# Patient Record
Sex: FEMALE | Race: WHITE | NOT HISPANIC OR LATINO | Employment: FULL TIME | URBAN - METROPOLITAN AREA
[De-identification: names, ages, dates, MRNs, and addresses within clinical notes are randomized per-mention and may not be internally consistent; named-entity substitution may affect disease eponyms.]

---

## 2023-06-23 ENCOUNTER — HOSPITAL ENCOUNTER (EMERGENCY)
Facility: CLINIC | Age: 23
Discharge: HOME | End: 2023-06-23
Attending: EMERGENCY MEDICINE | Admitting: EMERGENCY MEDICINE
Payer: MEDICAID

## 2023-06-23 VITALS
DIASTOLIC BLOOD PRESSURE: 85 MMHG | HEART RATE: 81 BPM | RESPIRATION RATE: 15 BRPM | BODY MASS INDEX: 24.25 KG/M2 | SYSTOLIC BLOOD PRESSURE: 120 MMHG | OXYGEN SATURATION: 99 % | HEIGHT: 68 IN | WEIGHT: 160 LBS | TEMPERATURE: 97.7 F

## 2023-06-23 DIAGNOSIS — S01.312A LACERATION OF LEFT EARLOBE, INITIAL ENCOUNTER: ICD-10-CM

## 2023-06-23 PROCEDURE — 12011 RPR F/E/E/N/L/M 2.5 CM/<: CPT

## 2023-06-23 PROCEDURE — 250N000011 HC RX IP 250 OP 636: Performed by: EMERGENCY MEDICINE

## 2023-06-23 PROCEDURE — 90471 IMMUNIZATION ADMIN: CPT | Performed by: EMERGENCY MEDICINE

## 2023-06-23 PROCEDURE — 99283 EMERGENCY DEPT VISIT LOW MDM: CPT | Mod: 25

## 2023-06-23 PROCEDURE — 90715 TDAP VACCINE 7 YRS/> IM: CPT | Performed by: EMERGENCY MEDICINE

## 2023-06-23 RX ADMIN — CLOSTRIDIUM TETANI TOXOID ANTIGEN (FORMALDEHYDE INACTIVATED), CORYNEBACTERIUM DIPHTHERIAE TOXOID ANTIGEN (FORMALDEHYDE INACTIVATED), BORDETELLA PERTUSSIS TOXOID ANTIGEN (GLUTARALDEHYDE INACTIVATED), BORDETELLA PERTUSSIS FILAMENTOUS HEMAGGLUTININ ANTIGEN (FORMALDEHYDE INACTIVATED), BORDETELLA PERTUSSIS PERTACTIN ANTIGEN, AND BORDETELLA PERTUSSIS FIMBRIAE 2/3 ANTIGEN 0.5 ML: 5; 2; 2.5; 5; 3; 5 INJECTION, SUSPENSION INTRAMUSCULAR at 04:43

## 2023-06-23 ASSESSMENT — ACTIVITIES OF DAILY LIVING (ADL): ADLS_ACUITY_SCORE: 35

## 2023-06-23 NOTE — ED TRIAGE NOTES
Pt presents to ED with reports of an ear injury when her cat jumped off of her head this morning, causing a tear to her earlobe.  Pt had an earring in place that was pulled down during event.  Unsure of last tetanus shot.  Injury occurred about 15 minutes PTA.  Pt had bandaid in place.     Triage Assessment     Row Name 06/23/23 0416       Triage Assessment (Adult)    Airway WDL WDL       Respiratory WDL    Respiratory WDL WDL       Skin Circulation/Temperature WDL    Skin Circulation/Temperature WDL WDL       Cardiac WDL    Cardiac WDL WDL       Peripheral/Neurovascular WDL    Peripheral Neurovascular WDL WDL       Cognitive/Neuro/Behavioral WDL    Cognitive/Neuro/Behavioral WDL WDL

## 2023-06-23 NOTE — ED PROVIDER NOTES
EMERGENCY DEPARTMENT ENCOUNTER      NAME: Rosa Simms  AGE: 23 year old female  YOB: 2000  MRN: [id number]  EVALUATION DATE & TIME: 6/23/2023  4:15 AM    PCP: System, Provider Not In    ED PROVIDER: Damion Bejarano M.D.      Chief Complaint   Patient presents with     Ear Injury         FINAL IMPRESSION:  1. Laceration of left earlobe, initial encounter          ED COURSE & MEDICAL DECISION MAKING:    Pertinent Labs & Imaging studies reviewed. (See chart for details)  23 year old female presents to the Emergency Department for evaluation of earlobe laceration.  Repaired as below.  Tetanus updated.  No signs of further injury.  Will discharge home.  Discussed wound care.    4:30 AM I met with the patient to gather history and to perform my initial exam. I discussed the plan for care while in the Emergency Department. PPE: gloves.  4:46 AM I repaired laceration. We discussed the plan for discharge and the patient is agreeable. Reviewed supportive cares, symptomatic treatment, outpatient follow up, and reasons to return to the Emergency Department. Patient to be discharged by ED RN.     At the conclusion of the encounter I discussed the results of all of the tests and the disposition. The questions were answered. The patient or family acknowledged understanding and was agreeable with the care plan.     Medical Decision Making    History:    Supplemental history from: Documented in chart, if applicable    External Record(s) reviewed: Documented in chart, if applicable. and Other: Immunization Records    Work Up:    Chart documentation includes differential considered and any EKGs or imaging independently interpreted by provider, where specified.    In additional to work up documented, I considered the following work up: Documented in chart, if applicable.    External consultation:    Discussion of management with another provider: Documented in chart, if applicable    Complicating factors:    Care  impacted by chronic illness: N/A    Care affected by social determinants of health: Access to Medical Care    Disposition considerations: Discharge. No recommendations on prescription strength medication(s). See documentation for any additional details.        0 minutes of critical care time     MEDICATIONS GIVEN IN THE EMERGENCY:  Medications   Tdap (tetanus-diphtheria-acell pertussis) (ADACEL) injection 0.5 mL (0.5 mLs Intramuscular $Given 6/23/23 9235)       NEW PRESCRIPTIONS STARTED AT TODAY'S ER VISIT  There are no discharge medications for this patient.         =================================================================    HPI    Patient information was obtained from: Patient    Use of : N/A        Rosa Simms is a 23 year old female with no pertinent history who presents to this ED via walk-in for evaluation of ear injury.    Patient reports that she was sleeping on the couch tonight with her cat. She states that her cat decided to jump off of the couch and caught her left earring in the process and tore her left earlobe about 45 minutes ago, prompting her to be seen in the ED. Otherwise, patient denies taking any medications regularly. No allergies to medications. She is unsure when her last tetanus was.     Per chart review, patient's tetanus was in 2011.     REVIEW OF SYSTEMS   Review of Systems   HENT:        Positive for tear to left earlobe      PAST MEDICAL HISTORY:  History reviewed. No pertinent past medical history.    PAST SURGICAL HISTORY:  History reviewed. No pertinent surgical history.        CURRENT MEDICATIONS:    No current facility-administered medications for this encounter.     No current outpatient medications on file.         ALLERGIES:  No Known Allergies    FAMILY HISTORY:  History reviewed. No pertinent family history.    SOCIAL HISTORY:   Social History     Socioeconomic History     Marital status: Single       VITALS:  /85   Pulse 81   Temp 97.7  F  "(36.5  C) (Oral)   Resp 15   Ht 1.727 m (5' 8\")   Wt 72.6 kg (160 lb)   SpO2 99%   BMI 24.33 kg/m      PHYSICAL EXAM    Physical Exam  Constitutional:       General: She is not in acute distress.     Appearance: Normal appearance. She is well-developed.   HENT:      Head: Normocephalic and atraumatic.      Ears:      Comments: Left earlobe has a 1 cm laceration that is not through and through but only over the anterior aspect.  Eyes:      General: No scleral icterus.     Conjunctiva/sclera: Conjunctivae normal.   Cardiovascular:      Rate and Rhythm: Normal rate.   Pulmonary:      Effort: Pulmonary effort is normal. No respiratory distress.   Abdominal:      General: Abdomen is flat.   Musculoskeletal:      Cervical back: Normal range of motion and neck supple.   Skin:     General: Skin is warm and dry.      Findings: No rash.   Neurological:      Mental Status: She is alert and oriented to person, place, and time.           LAB:  All pertinent labs reviewed and interpreted.  Labs Ordered and Resulted from Time of ED Arrival to Time of ED Departure - No data to display    RADIOLOGY:  Reviewed all pertinent imaging. Please see official radiology report.  No orders to display         PROCEDURES:   PROCEDURE: Laceration Repair   INDICATIONS: Laceration   PROCEDURE PROVIDER: Dr Damion Bejarano   SITE: Left Ear   TYPE/SIZE: simple, clean and no foreign body visualized  1 cm (total length)   FUNCTIONAL ASSESSMENT: Distal sensation, circulation and motor intact   MEDICATION: 2 mLs of 1% Lidocaine with epinephrine   PREPARATION: irrigation with Normal saline   DEBRIDEMENT: no debridement   CLOSURE:  Superficial layer closed with 3 stitches of 4-0 Vycril simple interrupted    Total number of sutures/staples placed: 3             I, Elaina Benitez, am serving as a scribe to document services personally performed by Dr. Damion Bejarano, based on my observation and the provider's statements to me. IDamion MD attest " that Elaina Benitez is acting in a scribe capacity, has observed my performance of the services and has documented them in accordance with my direction.    Damion Bejarano M.D.  Emergency Medicine  [facility] EMERGENCY ROOM  [address] [address]  [phone]  Dept: [phone]     Damion Bejarano MD  06/23/23 0595

## 2023-11-27 ENCOUNTER — NURSE TRIAGE (OUTPATIENT)
Dept: NURSING | Facility: CLINIC | Age: 23
End: 2023-11-27
Payer: MEDICAID

## 2023-11-27 ENCOUNTER — HOSPITAL ENCOUNTER (EMERGENCY)
Facility: CLINIC | Age: 23
Discharge: HOME | End: 2023-11-27
Attending: EMERGENCY MEDICINE | Admitting: EMERGENCY MEDICINE
Payer: MEDICAID

## 2023-11-27 VITALS
TEMPERATURE: 98.8 F | HEIGHT: 68 IN | RESPIRATION RATE: 18 BRPM | BODY MASS INDEX: 25.39 KG/M2 | OXYGEN SATURATION: 100 % | DIASTOLIC BLOOD PRESSURE: 80 MMHG | HEART RATE: 68 BPM | SYSTOLIC BLOOD PRESSURE: 125 MMHG | WEIGHT: 167.55 LBS

## 2023-11-27 DIAGNOSIS — G51.0 RIGHT-SIDED BELL'S PALSY: ICD-10-CM

## 2023-11-27 PROCEDURE — 250N000012 HC RX MED GY IP 250 OP 636 PS 637: Performed by: EMERGENCY MEDICINE

## 2023-11-27 PROCEDURE — 99284 EMERGENCY DEPT VISIT MOD MDM: CPT

## 2023-11-27 PROCEDURE — 250N000013 HC RX MED GY IP 250 OP 250 PS 637: Performed by: EMERGENCY MEDICINE

## 2023-11-27 RX ORDER — NITROFURANTOIN 25; 75 MG/1; MG/1
CAPSULE ORAL
COMMUNITY
Start: 2023-08-10

## 2023-11-27 RX ORDER — VALACYCLOVIR HYDROCHLORIDE 1 G/1
1000 TABLET, FILM COATED ORAL ONCE
Status: COMPLETED | OUTPATIENT
Start: 2023-11-27 | End: 2023-11-27

## 2023-11-27 RX ORDER — VALACYCLOVIR HYDROCHLORIDE 1 G/1
1000 TABLET, FILM COATED ORAL 3 TIMES DAILY
Qty: 21 TABLET | Refills: 0 | Status: SHIPPED | OUTPATIENT
Start: 2023-11-27 | End: 2023-12-04

## 2023-11-27 RX ORDER — PREDNISONE 20 MG/1
60 TABLET ORAL ONCE
Status: COMPLETED | OUTPATIENT
Start: 2023-11-27 | End: 2023-11-27

## 2023-11-27 RX ORDER — PREDNISONE 20 MG/1
60 TABLET ORAL DAILY
Qty: 21 TABLET | Refills: 0 | Status: SHIPPED | OUTPATIENT
Start: 2023-11-27 | End: 2023-12-04

## 2023-11-27 RX ADMIN — VALACYCLOVIR 1000 MG: 1 TABLET, FILM COATED ORAL at 16:36

## 2023-11-27 RX ADMIN — PREDNISONE 60 MG: 20 TABLET ORAL at 16:36

## 2023-11-27 NOTE — ED PROVIDER NOTES
"EMERGENCY DEPARTMENT ENCOUNTER      NAME: Rosa Simms  AGE: 23 year old female  YOB: 2000  MRN: [id number]  EVALUATION DATE & TIME: No admission date for patient encounter.    PCP: No Ref-Primary, Physician    ED PROVIDER: Mo Mcgowan M.D.      Chief Complaint   Patient presents with    Numbness         IMPRESSION  1. Right-sided Bell's palsy        PLAN  - prednisone 60mg q24h x7 days  - valacyclovir 1g q8h x7 days  - OTC eye drops q1h while awake  - OTC eye ointment w/ eye taped shut at night  - close eye clinic follow up  - discharge to home    ED COURSE & MEDICAL DECISION MAKING    ED Course as of 11/27/23 1638   Mon Nov 27, 2023   1621 23yoF with no significant past medical history presenting from home with her significant other for evaluation of right facial weakness; first noticed yesterday with she woke up at 11am (>24 hours ago). Denies any tingling, vision changes, speech changes, confusion, headache. States she just got over a \"cold\" a couple days ago. Denies any rash, fevers, chills, any other problems or complaints at this time. Came because she felt like her ability to close her right eye has been worsening since yesterday; denies any eye pain or redness.    Normal vitals on presentation. Calm on exam with mild right peripheral facial weakness (still able to close right eyelid completely) with CN 2-12 otherwise completely intact, no skin erythema or tenderness, no ear tenderness/redness with canal unremarkable, no meningismus, no scalp or temporal tenderness, clear lungs, normal work of breathing, otherwise completely normal neuro exam.    Has right Bell's palsy on exam; not severe as still able to close eyelid. No concern for central neuro process such as stroke. Doubt cellulitis, necrotizing fasciitis, sepsis, brain mass either. Patient comfortable not obtaining blood tests, CT, or MRI here now which I agree with. Given timing, given 1st doses of prednisone & acyclovir here " now and prescriptions for home. Advised OTC eye ointment at night & lubricating eye drops during the day. Patient comfortable with this plan and discharge home at this time. Return precautions and need for PCP follow up discussed and understood. No further questions at the time of discharge.       --------------------------------------------------------------------------------   --------------------------------------------------------------------------------     3:12 PM I met with the patient for the initial interview and physical examination. Discussed plan for treatment and workup in the ED. We discussed the plan for discharge and the patient is agreeable. Reviewed supportive cares, symptomatic treatment, outpatient follow up, and reasons to return to the Emergency Department. Patient to be discharged by ED RN.       This patient involved a high degree of complexity in medical decision making, as significant risks were present and assessed. Recent encounters & results in medical record reviewed by me.    All workup (i.e. any EKG/labs/imaging as per charting below) reviewed and independently interpreted by me. See respective sections for details.    Broad differential considered for this patient, including but not limited to:  Bell's palsy, stroke, mass, cellulitic, necrotizing fasciitis, sepsis, meningitis, brain abscess, parotiditis, OE      See additional MDM below if interested.      MEDICATIONS GIVEN IN THE EMERGENCY DEPARTMENT  Medications   valACYclovir (VALTREX) tablet 1,000 mg (1,000 mg Oral $Given 11/27/23 1636)   predniSONE (DELTASONE) tablet 60 mg (60 mg Oral $Given 11/27/23 1636)       NEW PRESCRIPTIONS STARTED AT TODAY'S ER VISIT  Current Discharge Medication List        START taking these medications    Details   predniSONE (DELTASONE) 20 MG tablet Take 3 tablets (60 mg) by mouth daily for 7 days  Qty: 21 tablet, Refills: 0      valACYclovir (VALTREX) 1000 mg tablet Take 1 tablet (1,000 mg) by mouth  3 times daily for 7 days  Qty: 21 tablet, Refills: 0           CONTINUE these medications which have NOT CHANGED    Details   nitroFURantoin macrocrystal-monohydrate (MACROBID) 100 MG capsule take 1 capsule by mouth twice daily for 5 days             =================================================================      HPI  Use of : N/A     Rosa Simms is a 23 year old female with no pertinent history who presents to this ED by walk-in for evaluation of facial weakness.    Patient reports that she woke up yesterday around 1100 with right eyelid weakness and wasn't able to fully blink out of her right eye.This progressively worsened and today (11/27) she was unable to blink the eye at all. She did endorse having some soreness in her right ear and  a sore throat. She also was recently treated with antibiotics for a UTI.    Per patient's partner, their roommate's daughter had been sick with foot-hand-mouth and pink eye. He had also been sick with congestion and rhinorrhea.      She denied any facial numbness or tingliness. She is not on any daily prescription medications. Patient reports that she does vape.      --------------- MEDICAL HISTORY ---------------  PAST MEDICAL HISTORY:  Reviewed independently by me.  History reviewed. No pertinent past medical history.  There is no problem list on file for this patient.      PAST SURGICAL HISTORY:  Reviewed independently by me.  History reviewed. No pertinent surgical history.    CURRENT MEDICATIONS:    Reviewed independently by me.  No current facility-administered medications for this encounter.    Current Outpatient Medications:     nitroFURantoin macrocrystal-monohydrate (MACROBID) 100 MG capsule, take 1 capsule by mouth twice daily for 5 days, Disp: , Rfl:     predniSONE (DELTASONE) 20 MG tablet, Take 3 tablets (60 mg) by mouth daily for 7 days, Disp: 21 tablet, Rfl: 0    valACYclovir (VALTREX) 1000 mg tablet, Take 1 tablet (1,000 mg) by mouth 3 times  "daily for 7 days, Disp: 21 tablet, Rfl: 0    ALLERGIES:  Reviewed independently by me.  No Known Allergies    FAMILY HISTORY:  Reviewed independently by me.  No family history on file.      SOCIAL HISTORY:   Reviewed independently by me.  Social History  Patient reports smoking vapes.     Socioeconomic History    Marital status: Single       --------------- PHYSICAL EXAM ---------------  Nursing notes and vitals independently reviewed by me.  VITALS:  Vitals:    11/27/23 1448 11/27/23 1601   BP: 115/81 125/80   Pulse: 83 68   Resp: 18    Temp: 98.8  F (37.1  C)    TempSrc: Temporal    SpO2: 99% 100%   Weight: 76 kg (167 lb 8.8 oz)    Height: 1.727 m (5' 8\")        PHYSICAL EXAM:    General:  alert, interactive, no distress  Eyes:  conjunctivae clear, conjugate gaze. PERRL @3 mm with EOMI. Mild right obicularis oculi weakness compared to left but still able to completely close right eyelid.  HENT:  atraumatic, nose with no rhinorrhea, oropharynx clear  Neck:  no meningismus  Cardiovascular:  HR in 70s  during exam, regular rhythm, no murmurs, brisk cap refill  Chest:  no chest wall tenderness  Pulmonary:  no stridor, normal phonation, normal work of breathing, clear lungs bilaterally  Abdomen:  soft, nondistended, nontender  :  no CVA tenderness  Back:  no midline spinal tenderness  Musculoskeletal:  no pretibial edema, no calf tenderness. Gross ROM intact to joints of extremities with no obvious deformities.  Skin:  warm, dry, no rash  Neuro:  awake, alert, answers questions appropriately with no aphasia or dysarthria, follows commands, moves all limbs  - mild right peripheral facial weakness with CN 2-12 otherwise intact, negative pronator drift, 5/5 strength to all extremities with sensation to light touch intact, no tremor steady unaccompanied gait  Psych:  calm, normal affect          --------------- ADDITIONAL MDM ---------------  History:  - I considered systemic symptoms of the presenting illness.  - " Supplemental history from:       -- patient, family (significant other)  - External Record(s) reviewed:       -- Inpatient/outpatient record (clinic visit 5/15/23), prior labs (blood 9/25/19), prior imaging       -- see above ED course & MDM for further details    Workup:  - Chart documentation above includes differential considered and any EKGs or imaging independently interpreted by provider.  - In additional to work up documented, I considered the following work up:       -- blood, CT, MRI    External Consultation:  - Discussion of management with another provider:       -- see above ED course & MDM for details    Complicating Factors:  - Care impacted by chronic illness:       -- see above MDM, past medical history, & problem list  - Care affected by social determinants of health:       -- see above social history       -- Access to Affordable Healthcare       -- Access to Medical Care (no PCP)    Disposition Considerations:  - Discharge       -- I considered escalation of care with admission to the hospital, but ultimately discharged the patient per decision making above       -- I recommended the patient continue their current prescription strength medication(s) as charted above in current medications list       -- I prescribed prescription strength medication(s) as charted above       -- I recommended over-the-counter medication(s) as charted above & in discharge instructions           I, SANTIAGO CALABRESE, am serving as a scribe to document services personally performed by Dr. Mo Mcgowan based on my observation and the provider's statements to me. I, Mo Mcgowan MD attest that SANTIAGO CALABRESE is acting in a scribe capacity, has observed my performance of the services and has documented them in accordance with my direction.      Mo Mcgowan MD  11/27/23  Emergency Medicine  [facility] EMERGENCY ROOM  [address] [address] [phone]  Dept:  [phone]     Mo Mcgowan MD  11/27/23 3962       Mo Mcgowan MD  11/27/23 8082

## 2023-11-27 NOTE — ED TRIAGE NOTES
Pt presents to ED with worsening right sided facial numbness and weakness beginning yesterday morning (greater than 24 hours). Speech is clear smile is equal.      Triage Assessment (Adult)       Row Name 11/27/23 1445          Triage Assessment    Airway WDL WDL        Respiratory WDL    Respiratory WDL WDL        Skin Circulation/Temperature WDL    Skin Circulation/Temperature WDL WDL        Cardiac WDL    Cardiac WDL WDL        Peripheral/Neurovascular WDL    Peripheral Neurovascular WDL WDL        Cognitive/Neuro/Behavioral WDL    Cognitive/Neuro/Behavioral WDL WDL

## 2023-11-27 NOTE — DISCHARGE INSTRUCTIONS
Take the steroid (prednisone) and antiviral medication (valacyclovir) as prescribed to help with the facial weakness    To make sure that your right eye does not dry out, do the following:  - OTC lubricating eye drops every 1 hour while awake  - place over-the-counter lubricating eye ointment along the inside of your lower eyelid & tape your eye shut when you sleep    Follow up with your Primary Care provider in 2 days for a recheck.    Return to the Emergency Department for any inability to walk, new or worsening symptoms, or any other concerns.

## 2023-11-27 NOTE — TELEPHONE ENCOUNTER
"Pt reports R eye symptoms.  Onset yesterday morning (approx 36 hours).  \"Couldn't blink it, was hard to squeeze closed all the way.\"  \"Now has spread down R side of nose into R side of face.\"  \"Still have quite a bit of movement and function.\"  However -> \"R side of face does not look symmetrical to L side.\"  \"Smile is weaker on R side.\"    Discussed possible [place] Palsy.  Nevertheless, advised immediate ED eval to also rule out stroke.  Has an IUD in place.    As symptoms are already present x 36 hours, pt intends to have partner drive her promptly to ED.  Understands nevertheless she may call 911 at any time enroute if needed.  Reports preference for [facility].    [facility] Nurse Advisor     Reason for Disposition   New neurologic deficit that is present NOW, sudden onset of ANY of the following: * Weakness of the face, arm, or leg on one side of the body* Numbness of the face, arm, or leg on one side of the body* Loss of speech or garbled speech    Additional Information   Negative: Difficult to awaken or acting confused (e.g., disoriented, slurred speech)    Protocols used: Neurologic Deficit-A-OH    "

## 2024-03-10 ENCOUNTER — HEALTH MAINTENANCE LETTER (OUTPATIENT)
Age: 24
End: 2024-03-10

## 2025-03-16 ENCOUNTER — HEALTH MAINTENANCE LETTER (OUTPATIENT)
Age: 25
End: 2025-03-16